# Patient Record
Sex: FEMALE | Race: WHITE | Employment: OTHER | URBAN - METROPOLITAN AREA
[De-identification: names, ages, dates, MRNs, and addresses within clinical notes are randomized per-mention and may not be internally consistent; named-entity substitution may affect disease eponyms.]

---

## 2017-01-01 ENCOUNTER — PATIENT OUTREACH (OUTPATIENT)
Dept: FAMILY MEDICINE CLINIC | Age: 82
End: 2017-01-01

## 2017-01-01 RX ORDER — TRAMADOL HYDROCHLORIDE 50 MG/1
50 TABLET ORAL
COMMUNITY

## 2017-01-01 RX ORDER — SENNOSIDES 8.6 MG/1
1 TABLET ORAL 2 TIMES DAILY
COMMUNITY

## 2017-01-03 NOTE — PROGRESS NOTES
Patient was admitted to THE [facility] on 12/24/16 with c/o bradycardia. She was discharged to [facility] on 12/30/16. Attempted to contact [facility] via telephone on 1/3/2017. Was transferred from  to floor but no - telephone rang unanswered for 5 minutes. Will attempt second call tomorrow.

## 2017-01-04 NOTE — PROGRESS NOTES
[facility] Follow Up for THE [facility] readmission from 12/24/16 - 12/30/16 for c/o arm pain and bradycardia. Discharge to [facility] for rehab.     NOTE:  Initial admission to [facility] 12/3 - 12/9/16 for fall with arm fx. D/C to [facility]   Readmission to THE [facility]  12/10 - 12/14/16 for chest pain. D/C to [facility]          RRAT score: 26. High    Medical History:     Past Medical History   Diagnosis Date    Anxiety     Arthropathy     Cardiac arrhythmia     Cataracts, bilateral     [facility] [address]     Depression     Difficulty urinating     Esophageal reflux     GERD (gastroesophageal reflux disease)     Glaucoma     Hematuria     High cholesterol     Hypercholesterolemia     Hypertension     Metabolic encephalopathy     Nocturia     Persistent atrial fibrillation ([facility] [address]     Seizure ([facility] [address]     Smoker     Stroke ([facility] [address]          This call was placed within two business days of hospital discharge but patient is currently inpatient at [facility] for an unknown duration. Course of current Hospitalization (referenced by [facility] EMR discharge summaries note dated 12/30/16):   ----------------------------------------------------------------------  \"Hospital Course:    HPI per Dr. Wilson Simpler: \"80 years old female with h/o htn depression anxiety gerd smoking stroke seizure, recently here for left shoulder fracture, comes back to hospital for left arm pain/weakness, has residual weakness on left side, concern for recurrent fracture; however she was here found to have bradycardia in early 45s, and per caregiver she is also confused- she is not at her baseline right now. Admitted for further eval\"    Pt admitted with symptomatic bradycardia on lopressor and cardizem with UTI. UTI resolved w/cipro and completed ABx prior to discharge.  Bradycardia resolved off cardizem and lopressor and did not require pacemaker placement. Has L humerus fracture, seen by ortho with plans to see in clinic on the 4th for possible ORIF vs conservative management if well outside cardiac and infectious issues vs remaining immobilized. Pain controlled on tramadol. \"   ----------------------------------------------------------------------   Medication Reconciliation completed: yes. New medications at discharge include:  traMADol (ULTRAM) 50 mg PO TABS- Take 1 Tab by Mouth Every 4 Hours As Needed for Pain.  sennosides (SENOKOT) 8.6 mg PO TABS - Take 8.6 mg by Mouth Twice Daily. Prescription Medication total: 10. (pharmacy consult for polypharm needed?) not at this time. May need review for polypharmacy upon discharge     Contacted [facility] for [facility]ES [facility]- spoke with Alan Welch RN CM who provided updated information as well as performing med-rec. Nurse Reilly Gaby states that duration of stay is unknown at this time but to please contact her as needed for updates.

## 2017-01-11 NOTE — PROGRESS NOTES
Call to [facility] to check patient status. Patient continues as inpatient for rehab at this facility for an unknown duration. Will continue to periodically contact SNF for status update.

## 2017-01-20 NOTE — PROGRESS NOTES
Contacted [facility] for status update on Mrs. Tere Marsh. Spoke with Mars Walsh RN,  for the facility. Ms. Mireya iDck states that Mrs. Tere Marsh continues as a patient at [facility] but has been informed by [facility] that her coverage for rehab is ending. For Mrs. Tere Marsh to remain at [facility], she would become self-pay. Ms. Mireya Dick spoke with Mrs. Cisneros's daughter, Jacqui Trujillo who is the patient's POA,  today and discussed plans. Ms. Azul Veliz is looking at either transferring Mrs. Tere Marsh into a LTC facility or providing 24-7 care in the home. The care giver to date was not there around the clock and returning home without more comprehensive care is not recommended. Ms. Azul Veliz is located on the [facility] and will likely not be able to make arrangements today as it is Friday. Ms. Queen Divine that Mrs. Tere Marsh will remain at [facility] at least through Monday. This NN will contact Ms. Olivia early next week to determine patient status and disposition.

## 2017-01-25 NOTE — PROGRESS NOTES
Attempt to contact [facility]  to determine status of Mrs. Starla Guerrero. LMR for Justina Bruno RN,  requesting information.

## 2017-01-26 NOTE — PROGRESS NOTES
Incoming call from Fe Montes De Oca RN,  at [facility]. She states that Mrs. Ganesh Mendez is currently at [facility] for long term care. Final disposition has yet to be determined as Ms. Jeff Graham has been conferring with Mae Marsh, Mrs. Cisneros's daughter. Ms. Jeff Graham states that Mrs. Cisneros's overall condition has deteriorated to the degree that she requires either a residential facility for long term care or 24-7 supported care and supervision in the home. Ms. Jeff Graham has agreed to keep me up to date as developments occur. Will check back with her in one week.

## 2017-02-01 ENCOUNTER — PATIENT OUTREACH (OUTPATIENT)
Dept: FAMILY MEDICINE CLINIC | Age: 82
End: 2017-02-01